# Patient Record
Sex: FEMALE | Race: WHITE | Employment: UNEMPLOYED | URBAN - METROPOLITAN AREA
[De-identification: names, ages, dates, MRNs, and addresses within clinical notes are randomized per-mention and may not be internally consistent; named-entity substitution may affect disease eponyms.]

---

## 2021-02-11 ENCOUNTER — OFFICE VISIT (OUTPATIENT)
Dept: FAMILY MEDICINE CLINIC | Age: 41
End: 2021-02-11
Payer: COMMERCIAL

## 2021-02-11 ENCOUNTER — TELEPHONE (OUTPATIENT)
Dept: FAMILY MEDICINE CLINIC | Age: 41
End: 2021-02-11

## 2021-02-11 ENCOUNTER — NURSE TRIAGE (OUTPATIENT)
Dept: OTHER | Facility: CLINIC | Age: 41
End: 2021-02-11

## 2021-02-11 VITALS
SYSTOLIC BLOOD PRESSURE: 120 MMHG | DIASTOLIC BLOOD PRESSURE: 70 MMHG | HEART RATE: 55 BPM | OXYGEN SATURATION: 98 % | TEMPERATURE: 97.4 F | WEIGHT: 152 LBS

## 2021-02-11 DIAGNOSIS — N89.8 VAGINAL DISCHARGE: ICD-10-CM

## 2021-02-11 DIAGNOSIS — Z00.00 HEALTHCARE MAINTENANCE: Primary | ICD-10-CM

## 2021-02-11 PROCEDURE — 99386 PREV VISIT NEW AGE 40-64: CPT | Performed by: FAMILY MEDICINE

## 2021-02-11 RX ORDER — FLUCONAZOLE 150 MG/1
150 TABLET ORAL ONCE
Qty: 2 TABLET | Refills: 0 | Status: SHIPPED | OUTPATIENT
Start: 2021-02-11 | End: 2021-02-11

## 2021-02-11 SDOH — HEALTH STABILITY: MENTAL HEALTH: HOW OFTEN DO YOU HAVE A DRINK CONTAINING ALCOHOL?: NOT ASKED

## 2021-02-11 SDOH — ECONOMIC STABILITY: INCOME INSECURITY: HOW HARD IS IT FOR YOU TO PAY FOR THE VERY BASICS LIKE FOOD, HOUSING, MEDICAL CARE, AND HEATING?: NOT HARD AT ALL

## 2021-02-11 SDOH — ECONOMIC STABILITY: FOOD INSECURITY: WITHIN THE PAST 12 MONTHS, YOU WORRIED THAT YOUR FOOD WOULD RUN OUT BEFORE YOU GOT MONEY TO BUY MORE.: NEVER TRUE

## 2021-02-11 SDOH — HEALTH STABILITY: MENTAL HEALTH: HOW MANY STANDARD DRINKS CONTAINING ALCOHOL DO YOU HAVE ON A TYPICAL DAY?: NOT ASKED

## 2021-02-11 SDOH — ECONOMIC STABILITY: TRANSPORTATION INSECURITY
IN THE PAST 12 MONTHS, HAS THE LACK OF TRANSPORTATION KEPT YOU FROM MEDICAL APPOINTMENTS OR FROM GETTING MEDICATIONS?: NO

## 2021-02-11 SDOH — ECONOMIC STABILITY: TRANSPORTATION INSECURITY
IN THE PAST 12 MONTHS, HAS LACK OF TRANSPORTATION KEPT YOU FROM MEETINGS, WORK, OR FROM GETTING THINGS NEEDED FOR DAILY LIVING?: NO

## 2021-02-11 ASSESSMENT — PATIENT HEALTH QUESTIONNAIRE - PHQ9
SUM OF ALL RESPONSES TO PHQ9 QUESTIONS 1 & 2: 0
SUM OF ALL RESPONSES TO PHQ QUESTIONS 1-9: 0
SUM OF ALL RESPONSES TO PHQ QUESTIONS 1-9: 0

## 2021-02-11 NOTE — TELEPHONE ENCOUNTER
Patient called Latoya Diaz at [facility])  with red flag complaint. Brief description of triage: Patient calling for concerns about vaginal symptoms. Triage indicates for patient to see PCP within 24 hours. Care advice provided, patient verbalizes understanding; denies any other questions or concerns; instructed to call back for any new or worsening symptoms. Writer provided warm transfer to Latoya Diaz at [facility] for appointment scheduling. Attention Provider: Thank you for allowing me to participate in the care of your patient. The patient was connected to triage in response to information provided to the [facility]. Please do not respond through this encounter as the response is not directed to a shared pool. Reason for Disposition   MODERATE-SEVERE itching (i.e., interferes with school, work, or sleep)    Answer Assessment - Initial Assessment Questions  1. SYMPTOM: \"What's the main symptom you're concerned about? \" (e.g., pain, itching, dryness)      Irritation, swelling on left side, \"flap of skin\", redness    2. LOCATION: \"Where is the redness and swelling located? \" (e.g., inside/outside, left/right)      Left side outside    3. ONSET: \"When did the swelling and redness start? \"      Beginning of this week    4. PAIN: \"Is there any pain? \" If so, ask: \"How bad is it? \" (Scale: 1-10; mild, moderate, severe)      Just irritation    5. ITCHING: \"Is there any itching? \" If so, ask: \"How bad is it? \" (Scale: 1-10; mild, moderate, severe)      Yes, \"super itchy\"    6. CAUSE: \"What do you think is causing the discharge? \" \"Have you had the same problem before? What happened then? \"      Possible yeast infection    7. OTHER SYMPTOMS: \"Do you have any other symptoms? \" (e.g., fever, itching, vaginal bleeding, pain with urination, injury to genital area, vaginal foreign body)      Clear to white drainage    8. PREGNANCY: \"Is there any chance you are pregnant? \" \"When was your last menstrual period? \"      No    Protocols used: VAGINAL SYMPTOMS-ADULT-AH

## 2021-02-11 NOTE — PROGRESS NOTES
Lennie Adams   YOB: 1980    Date of Visit:  2/11/2021        No Known Allergies  Outpatient Medications Marked as Taking for the 2/11/21 encounter (Office Visit) with Ahmet Christensen MD   Medication Sig Dispense Refill    fluconazole (DIFLUCAN) 150 MG tablet Take 1 tablet by mouth once for 1 dose May repeat in 5 days, if symptoms persist or recur. 2 tablet 0         Vitals:    02/11/21 1240   BP: 120/70   Site: Right Upper Arm   Position: Sitting   Cuff Size: Medium Adult   Pulse: 55   Temp: 97.4 °F (36.3 °C)   SpO2: 98%   Weight: 152 lb (68.9 kg)     There is no height or weight on file to calculate BMI. Wt Readings from Last 3 Encounters:   02/11/21 152 lb (68.9 kg)     BP Readings from Last 3 Encounters:   02/11/21 120/70        Chief Complaint   Patient presents with    Groin Swelling     vaginal discharge and irritation        HPI    Salma Yin presents to establish care. she has the following concerns today:    Vaginal discharge: pt reports she has had vaginal itching and discharge for the last week or two. [facility]- it helped but still has symptoms. Area on her labia seems to stick out and she hadn't noticed it before - was examining the area because of the itching so it may have been there before. Sexually active with her . She is not worried about STD's. SH: 2/2021: moved her recently from [place] as her family is here. Has 2 kids. She stays at home with them. Also lives with her . History reviewed. No pertinent past medical history. History reviewed. No pertinent surgical history.     Social History     Socioeconomic History    Marital status:      Spouse name: Not on file    Number of children: Not on file    Years of education: Not on file    Highest education level: Not on file   Occupational History    Not on file   Social Needs    Financial resource strain: Not hard at all   [employer] insecurity     Worry: Never true     Inability: Never true    Transportation needs     Medical: No     Non-medical: No   Tobacco Use    Smoking status: Never Smoker    Smokeless tobacco: Never Used   Substance and Sexual Activity    Alcohol use: Not Currently    Drug use: Never    Sexual activity: Yes     Partners: Male     Comment:  s/p vasectomy   Lifestyle    Physical activity     Days per week: Not on file     Minutes per session: Not on file    Stress: Not on file   Relationships    Social connections     Talks on phone: Not on file     Gets together: Not on file     Attends [employer] service: Not on file     Active member of club or organization: Not on file     Attends meetings of clubs or organizations: Not on file     Relationship status: Not on file    Intimate partner violence     Fear of current or ex partner: Not on file     Emotionally abused: Not on file     Physically abused: Not on file     Forced sexual activity: Not on file   Other Topics Concern    Not on file   Social History Narrative    Not on file       Family History   Problem Relation Age of Onset    No Known Problems Mother     No Known Problems Father     Breast Cancer Paternal Grandmother          Review of Systems  Complete review of systems negative except as documented in the HPI. Physical Exam  Constitutional:       General: She is not in acute distress. Appearance: She is well-developed. HENT:      Head: Normocephalic and atraumatic. Right Ear: Tympanic membrane, ear canal and external ear normal. There is no impacted cerumen. Tympanic membrane is not injected or bulging. Left Ear: Tympanic membrane, ear canal and external ear normal. There is no impacted cerumen. Tympanic membrane is not injected or bulging. Nose: Nose normal.      Mouth/Throat:      Mouth: Mucous membranes are moist.      Pharynx: No oropharyngeal exudate or posterior oropharyngeal erythema.    Eyes:      General: Lids

## 2021-02-12 LAB
CANDIDA SPECIES, DNA PROBE: NORMAL
GARDNERELLA VAGINALIS, DNA PROBE: NORMAL
HPV COMMENT: NORMAL
HPV TYPE 16: NOT DETECTED
HPV TYPE 18: NOT DETECTED
HPVOH (OTHER TYPES): NOT DETECTED
TRICHOMONAS VAGINALIS DNA: NORMAL

## 2021-02-12 NOTE — TELEPHONE ENCOUNTER
Pt called; please call her back, she didn't get any of the paperwork because she had to leave to get her kids off the bus

## 2021-04-21 ENCOUNTER — HOSPITAL ENCOUNTER (OUTPATIENT)
Dept: MAMMOGRAPHY | Age: 41
Discharge: HOME | End: 2021-04-21
Payer: COMMERCIAL

## 2021-04-21 VITALS — BODY MASS INDEX: 20.02 KG/M2 | WEIGHT: 143 LBS | HEIGHT: 71 IN

## 2021-04-21 DIAGNOSIS — Z12.31 ENCOUNTER FOR SCREENING MAMMOGRAM FOR BREAST CANCER: ICD-10-CM

## 2021-04-21 PROCEDURE — 77063 BREAST TOMOSYNTHESIS BI: CPT

## 2021-05-06 ENCOUNTER — HOSPITAL ENCOUNTER (OUTPATIENT)
Dept: MAMMOGRAPHY | Age: 41
Discharge: HOME | End: 2021-05-11
Payer: COMMERCIAL

## 2021-05-06 VITALS — WEIGHT: 116 LBS | HEIGHT: 63 IN | BODY MASS INDEX: 20.55 KG/M2

## 2021-05-06 DIAGNOSIS — Z12.31 VISIT FOR SCREENING MAMMOGRAM: ICD-10-CM
